# Patient Record
Sex: MALE | Race: AMERICAN INDIAN OR ALASKA NATIVE | NOT HISPANIC OR LATINO | URBAN - METROPOLITAN AREA
[De-identification: names, ages, dates, MRNs, and addresses within clinical notes are randomized per-mention and may not be internally consistent; named-entity substitution may affect disease eponyms.]

---

## 2022-04-28 ENCOUNTER — EMERGENCY (EMERGENCY)
Facility: HOSPITAL | Age: 22
LOS: 1 days | Discharge: HOME | End: 2022-04-28
Attending: STUDENT IN AN ORGANIZED HEALTH CARE EDUCATION/TRAINING PROGRAM | Admitting: STUDENT IN AN ORGANIZED HEALTH CARE EDUCATION/TRAINING PROGRAM
Payer: MEDICAID

## 2022-04-28 VITALS
RESPIRATION RATE: 15 BRPM | TEMPERATURE: 98 F | DIASTOLIC BLOOD PRESSURE: 74 MMHG | HEART RATE: 102 BPM | SYSTOLIC BLOOD PRESSURE: 129 MMHG | OXYGEN SATURATION: 98 %

## 2022-04-28 VITALS
SYSTOLIC BLOOD PRESSURE: 120 MMHG | OXYGEN SATURATION: 100 % | RESPIRATION RATE: 16 BRPM | DIASTOLIC BLOOD PRESSURE: 63 MMHG | TEMPERATURE: 98 F | HEART RATE: 86 BPM

## 2022-04-28 DIAGNOSIS — Z98.89 OTHER SPECIFIED POSTPROCEDURAL STATES: Chronic | ICD-10-CM

## 2022-04-28 PROCEDURE — 99284 EMERGENCY DEPT VISIT MOD MDM: CPT

## 2022-04-28 NOTE — ED PROVIDER NOTE - CLINICAL SUMMARY MEDICAL DECISION MAKING FREE TEXT BOX
Aimee SANDRA: 21M unknown pmh brought in after being found intoxicated in back of car, reportedly was drinking rum and coke, was out with friends at bars. On initial assessment pt is clinically intoxicated, however denies falls or trauma, no complaints. exam vss non toxic PE as above ddx c/f likely etoh intox, is intoxicated, no signs of trauma, will reassess when clinically sober, check fingerstick.

## 2022-04-28 NOTE — ED ADULT TRIAGE NOTE - CHIEF COMPLAINT QUOTE
awake found in back of his car intoxicated was drinking rum and coke  unsteady on feet   no c/o pain no falls or injuries PMHx asthma

## 2022-04-28 NOTE — ED PROVIDER NOTE - PATIENT PORTAL LINK FT
You can access the FollowMyHealth Patient Portal offered by [facility] by registering at the following website: [email or web address]. By joining [facility] FollowMyHealth portal, you will also be able to view your health information using other applications (apps) compatible with our system.

## 2022-04-28 NOTE — ED ADULT NURSE REASSESSMENT NOTE - NS ED NURSE REASSESS COMMENT FT1
ER report taking from night shift, pt awake, cooperative, ambulate to Br gait stable, pt stated not knowing why his friends brought him to hosp.  pt denies falling, or injuries, denies HA, dizziness, no N/V.   belonging given back pt acknowledge receiving see receipt.       Jazmine Campoverde RN

## 2022-04-28 NOTE — ED ADULT NURSE NOTE - OBJECTIVE STATEMENT
pt rcd to spot 26a. pt BIBEMS. pt a&ox4. pt presents to ER intox. pt states that he was out drinking at the bar with his friends and had 7-8 rum and cokes and feels "too drunk". pt states his last drink was at 03:00AM. pt states that he was in the back of his car and his friends called EMS to bring him to the hospital. pt denies chest pain, sob, headache, nausea, vomiting, dizziness, palpitations at this time. pt denies LOC or falls. pt denies SI/HI. pt denies auditory, visual, tactile hallucinations at this time. pt safey maintained. pt respirations even and unlabored with no accessory muscle use. pt normal sinus on monitor. belongings gathered in triage and taken to security (paper in chart). rest of belongings collected and gathered across from room 21. pt pmhx asthma. pt denies allergies. awaiting md rdz. will continue to monitor. pt rcd to spot 26a. pt BIBEMS. pt a&ox4. pt presents to ER intox. pt states that he was out drinking at the bar with his friends and had 7-8 rum and cokes and feels "too drunk". pt states his last drink was at 03:00AM. pt states that he was in the back of his car and his friends called EMS to bring him to the hospital. pt denies chest pain, sob, headache, nausea, vomiting, dizziness, palpitations at this time. pt denies LOC or falls. pt presents with no trauma or injuries. pt denies SI/HI. pt denies auditory, visual, tactile hallucinations at this time. pt safey maintained. pt respirations even and unlabored with no accessory muscle use. pt normal sinus on monitor. belongings gathered in triage and taken to security (paper in chart). rest of belongings collected and gathered across from room 21. pt pmhx asthma. pt denies allergies. awaiting md rdz. will continue to monitor. pt rcd to spot 26a. pt BIBEMS. pt a&ox4. pt presents to ER intox. pt states that he was out drinking at the bar with his friends and had 7-8 rum and cokes and feels "too drunk". pt states his last drink was at 03:00AM. pt states that he was in the back of his car and his friends called EMS to bring him to the hospital. pt denies chest pain, sob, headache, nausea, vomiting, dizziness, palpitations at this time. pt denies LOC or falls. pt presents with no trauma or injuries. pt denies SI/HI. pt denies auditory, visual, tactile hallucinations at this time. pt safety maintained. pt respirations even and unlabored with no accessory muscle use. pt normal sinus on monitor. belongings gathered in triage and taken to security (paper in chart). rest of belongings collected and gathered across from room 21. pt pmhx asthma. pt denies allergies. awaiting md rdz. will continue to monitor.

## 2022-04-28 NOTE — ED PROVIDER NOTE - OBJECTIVE STATEMENT
Aimee SANDRA: 21M unknown pmh brought in after being found intoxicated in back of car, reportedly was drinking rum and coke, was out with friends at bars. On initial assessment pt is clinically intoxicated, however denies falls or trauma, no complaints.

## 2022-04-28 NOTE — ED ADULT NURSE NOTE - NSIMPLEMENTINTERV_GEN_ALL_ED
Implemented All Fall Risk Interventions:  [place] to call system. Call bell, personal items and telephone within reach. Instruct patient to call for assistance. Room bathroom lighting operational. Non-slip footwear when patient is off stretcher. Physically safe environment: no spills, clutter or unnecessary equipment. Stretcher in lowest position, wheels locked, appropriate side rails in place. Provide visual cue, wrist band, yellow gown, etc. Monitor gait and stability. Monitor for mental status changes and reorient to person, place, and time. Review medications for side effects contributing to fall risk. Reinforce activity limits and safety measures with patient and family.

## 2022-04-28 NOTE — ED PROVIDER NOTE - PHYSICAL EXAMINATION
Aimee SANDRA:  VITALS: Initial triage and subsequent vitals have been reviewed by me.  GEN APPEARANCE: WDWN, alert and cooperative, non-toxic appearing and in NAD, resting comfortably, clinically sober   HEAD: Atraumatic, normocephalic   EYES: PERRLa, EOMI, vision grossly intact.   EARS: Gross hearing intact.   NOSE: No nasal discharge, no external evidence of epistaxis.   NECK: Supple  CV: RRR, S1S2, no c/r/m/g. No cyanosis or pallor. Extremities warm, well perfused. Cap refill <2 seconds. No bruits.   LUNGS: CTAB. No wheezing. No rales. No rhonchi. No diminished breath sounds.   ABDOMEN: Soft, NTND. No guarding or rebound. No masses.   MSK/EXT: Spine appears normal, no spine point tenderness. No CVA ttp. Normal muscular development. Pelvis stable. No obvious joint or bony deformity, no peripheral edema.   NEURO: Sensation and motor normal x4 extremities.   SKIN: Normal color for race, warm, dry and intact. No evidence of rash.  PSYCH: Normal mood and affect.

## 2022-04-28 NOTE — ED PROVIDER NOTE - NSFOLLOWUPINSTRUCTIONS_ED_ALL_ED_FT
You were seen in the Emergency Department for alcohol intoxication. Refrain from excessive drinking.      1) Continue all previously prescribed medications as directed.    2) Follow up with your primary care physician - take copies of your results.    3) Return to the Emergency Department for worsening or persistent symptoms, and/or ANY NEW OR CONCERNING SYMPTOMS.

## 2022-04-28 NOTE — ED PROVIDER NOTE - IV ALTEPLASE ADMIN OUTSIDE HIDDEN
show Implemented All Fall with Harm Risk Interventions:  [place] to call system. Call bell, personal items and telephone within reach. Instruct patient to call for assistance. Room bathroom lighting operational. Non-slip footwear when patient is off stretcher. Physically safe environment: no spills, clutter or unnecessary equipment. Stretcher in lowest position, wheels locked, appropriate side rails in place. Provide visual cue, wrist band, yellow gown, etc. Monitor gait and stability. Monitor for mental status changes and reorient to person, place, and time. Review medications for side effects contributing to fall risk. Reinforce activity limits and safety measures with patient and family. Provide visual clues: red socks.

## 2022-04-28 NOTE — ED PROVIDER NOTE - PROGRESS NOTE DETAILS
JOSR Alejo (PGY-2) - pt walking, sober, states he feels great and would like to go home. His friend will come . Aimee SANDRA: Pt assessed at beside. Pt resting comfortably, pain controlled, pt questions answered. Vital signs stable. has no complaints, denies trauma, Denies numbness, tingling or loss of sensation. Denies loss of motor function. is sober, tolerating PO, stable for dc. Aimee SANDRA: Pt assessed at beside. Pt resting comfortably, pain controlled, pt questions answered. Vital signs stable. has no complaints, admits to etoh last night, denies trauma, Denies numbness, tingling or loss of sensation. Denies loss of motor function. is sober, tolerating PO, stable for dc.

## 2023-06-26 NOTE — ED ADULT NURSE NOTE - CAS DISCH ACCOMP BY
[FreeTextEntry3] : pt seen, I have reviewed the above and agree with all pertinent clinical information including history and physical examination and agree with treatment plan.\par  Family

## 2024-05-15 ENCOUNTER — NON-APPOINTMENT (OUTPATIENT)
Age: 24
End: 2024-05-15